# Patient Record
Sex: FEMALE | Race: WHITE | URBAN - METROPOLITAN AREA
[De-identification: names, ages, dates, MRNs, and addresses within clinical notes are randomized per-mention and may not be internally consistent; named-entity substitution may affect disease eponyms.]

---

## 2021-01-08 NOTE — PROGRESS NOTES
Jeannine Upton is a 26 y.o. female who was seen by synchronous (real-time) audio-video technology on 2021 for Establish Care and Other (positive PPD)    Assessment & Plan:   Diagnoses and all orders for this visit:    1. Positive PPD  -     METABOLIC PANEL, COMPREHENSIVE; Future  - Quantiferon TB pending     2. Screening for lipid disorders  -     LIPID PANEL; Future    3. Cervical cancer screening  -     REFERRAL TO GYNECOLOGY    4. Encounter to establish care   Advised to complete fasting labs   Mychart activation sent via email/text, patient encouraged to activate for medical records access  12  Follow-up and Dispositions    · Return in about 1 year (around 2022) for CPE. 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    Gyn Hx:  Last PAP: over three years    Miscarriage: 0  : 0  Date of LMP: a month ago  Hx of STDs: No  Birth Control: no     Past Medical Hx:  None     Surgical Hx:  Hernia surgery as a child    Specialists:  None    Family Hx:  HTN- mother  Diabetes- none  HLD- none  MI- none  Sudden cardiac death- none  Stroke - none  CAD- none  Cancer- MGM, MGF (both lung)  Mental Health Disorders- None  Does not know father's side of family hx (sperm donor)    Preventive:  Last eye exam- over a year ago  Last dental exam- within the last two years  Flu vaccine- received on 2021  Tetanus vaccine- received within the last 8 years  MyChart Activation - sent today    HEALTH CONCERNS:    Positive TB Test-  Was tested for school  Skin test was positive  Did CXR, which was negative  Blood work was done, results pending  Denies weight loss, night sweats, hemoptysis, cough    Patient Active Problem List   Diagnosis Code    Positive PPD R76.11       No Known Allergies   History reviewed. No pertinent past medical history.    Social History     Socioeconomic History    Marital status: Not on file     Spouse name: Not on file    Number of children: Not on file    Years of education: Not on file    Highest education level: Not on file   Occupational History    Not on file   Social Needs    Financial resource strain: Not on file    Food insecurity     Worry: Not on file     Inability: Not on file    Transportation needs     Medical: Not on file     Non-medical: Not on file   Tobacco Use    Smoking status: Never Smoker    Smokeless tobacco: Never Used   Substance and Sexual Activity    Alcohol use: Yes     Frequency: 2-4 times a month     Drinks per session: 3 or 4     Binge frequency: Never    Drug use: Never    Sexual activity: Yes     Partners: Male     Birth control/protection: None   Lifestyle    Physical activity     Days per week: Not on file     Minutes per session: Not on file    Stress: Not on file   Relationships    Social connections     Talks on phone: Not on file     Gets together: Not on file     Attends Protestant service: Not on file     Active member of club or organization: Not on file     Attends meetings of clubs or organizations: Not on file     Relationship status: Not on file    Intimate partner violence     Fear of current or ex partner: Not on file     Emotionally abused: Not on file     Physically abused: Not on file     Forced sexual activity: Not on file   Other Topics Concern    Not on file   Social History Narrative    Not on file      History reviewed. No pertinent surgical history. Family History   Problem Relation Age of Onset    Hypertension Mother         Review of Systems   Constitutional: Negative for chills, fever, malaise/fatigue and weight loss. Respiratory: Negative for cough, hemoptysis, sputum production and shortness of breath. Cardiovascular: Negative for chest pain and palpitations. Gastrointestinal: Negative for nausea and vomiting. Neurological: Negative for dizziness, weakness and headaches.      OBJECTIVE: Physical Exam   Constitutional: Stable-appearing, in no distress, alert and oriented  HENT:   Head: Normocephalic and atraumatic. Ears:  Hearing grossly intact. Mouth:  No visible perioral lesions, cyanosis, or lip swelling. Pulmonary/Chest: Does not appear dyspneic, no audible wheezes or nasal flaring. Musculoskeletal: Grossly normal active ROM in upper extremities. Neurological:  Intact recent memory, no facial or eyelid drooping, no speech impairment, answering questions appropriately. Psychiatric: Judgment and insight good, normal mood and affect. We discussed the expected course, resolution and complications of the diagnosis(es) in detail. Medication risks, benefits, costs, interactions, and alternatives were discussed as indicated. I advised her to contact the office if her condition worsens, changes or fails to improve as anticipated. She expressed understanding with the diagnosis(es) and plan. Yoselin Ramsey, who was evaluated through a synchronous (real-time) audio-video encounter, and/or her healthcare decision maker, is aware that it is a billable service, with coverage as determined by her insurance carrier. provided verbal consent to proceed: Yes, and patient identification was verified. It was conducted pursuant to the emergency declaration under the [facility], [address] [place] [address] authority and the [employer] and [employer] Act. A caregiver was present when appropriate. Ability to conduct physical exam was limited. I was in the office. The patient was at home.     GUME Núñez

## 2021-01-11 ENCOUNTER — VIRTUAL VISIT (OUTPATIENT)
Dept: FAMILY MEDICINE CLINIC | Age: 26
End: 2021-01-11
Payer: COMMERCIAL

## 2021-01-11 DIAGNOSIS — Z76.89 ENCOUNTER TO ESTABLISH CARE: ICD-10-CM

## 2021-01-11 DIAGNOSIS — R76.11 POSITIVE PPD: Primary | ICD-10-CM

## 2021-01-11 DIAGNOSIS — Z12.4 CERVICAL CANCER SCREENING: ICD-10-CM

## 2021-01-11 DIAGNOSIS — Z13.220 SCREENING FOR LIPID DISORDERS: ICD-10-CM

## 2021-01-11 PROCEDURE — 99203 OFFICE O/P NEW LOW 30 MIN: CPT | Performed by: NURSE PRACTITIONER

## 2021-01-11 NOTE — PROGRESS NOTES
Jordi Santiago presents today for   Chief Complaint   Patient presents with   [facility] Establish Care    Other     positive PPD       Jordi Santiago preferred language for health care discussion is english/other. Is someone accompanying this pt? no    Is the patient using any DME equipment during [address]? no    Depression Screening:  3 most recent PHQ Screens 1/11/2021   Little interest or pleasure in doing things Not at all   Feeling down, depressed, irritable, or hopeless Not at all   Total Score PHQ 2 0       Learning Assessment:  Learning Assessment 1/11/2021   PRIMARY LEARNER Patient   HIGHEST LEVEL OF EDUCATION - PRIMARY LEARNER  4 YEARS OF COLLEGE   BARRIERS PRIMARY LEARNER NONE   CO-LEARNER CAREGIVER No   PRIMARY LANGUAGE ENGLISH    NEED No   LEARNER PREFERENCE PRIMARY DEMONSTRATION   ANSWERED BY patient   RELATIONSHIP SELF       Abuse Screening:  Abuse Screening Questionnaire 1/11/2021   Do you ever feel afraid of your partner? N   Are you in a relationship with someone who physically or mentally threatens you? N   Is it safe for you to go home? Y       Generalized Anxiety  No flowsheet data found. There are no preventive care reminders to display for this patient. .      Health Maintenance reviewed and discussed and ordered per Provider. Jordi Santiago is updated on all       Advance Directive:  1. Do you have an advance directive in place?  Patient Reply:no